# Patient Record
Sex: FEMALE | Race: BLACK OR AFRICAN AMERICAN | Employment: UNEMPLOYED | URBAN - METROPOLITAN AREA
[De-identification: names, ages, dates, MRNs, and addresses within clinical notes are randomized per-mention and may not be internally consistent; named-entity substitution may affect disease eponyms.]

---

## 2017-02-17 ENCOUNTER — ANESTHESIA EVENT (OUTPATIENT)
Dept: SURGERY | Age: 62
End: 2017-02-17
Payer: MEDICAID

## 2017-02-17 RX ORDER — CARVEDILOL 12.5 MG/1
TABLET ORAL 2 TIMES DAILY WITH MEALS
COMMUNITY

## 2017-02-17 RX ORDER — NITROGLYCERIN 0.4 MG/1
TABLET SUBLINGUAL
COMMUNITY

## 2017-02-17 RX ORDER — HYDROCHLOROTHIAZIDE 50 MG/1
25 TABLET ORAL DAILY
COMMUNITY

## 2017-02-17 RX ORDER — ASPIRIN 81 MG/1
TABLET ORAL DAILY
COMMUNITY

## 2017-02-17 RX ORDER — VALACYCLOVIR HYDROCHLORIDE 500 MG/1
500 TABLET, FILM COATED ORAL 2 TIMES DAILY
COMMUNITY

## 2017-02-17 RX ORDER — LISINOPRIL 20 MG/1
TABLET ORAL DAILY
COMMUNITY

## 2017-02-17 RX ORDER — CLOPIDOGREL BISULFATE 75 MG/1
75 TABLET ORAL
COMMUNITY
End: 2018-06-28

## 2017-02-17 RX ORDER — EZETIMIBE 10 MG/1
TABLET ORAL
COMMUNITY

## 2017-02-17 RX ORDER — ROSUVASTATIN CALCIUM 40 MG/1
40 TABLET, COATED ORAL
COMMUNITY

## 2017-02-20 ENCOUNTER — HOSPITAL ENCOUNTER (OUTPATIENT)
Age: 62
Setting detail: OUTPATIENT SURGERY
Discharge: HOME | End: 2017-02-20
Attending: DENTIST | Admitting: DENTIST
Payer: MEDICAID

## 2017-02-20 ENCOUNTER — SURGERY (OUTPATIENT)
Age: 62
End: 2017-02-20

## 2017-02-20 ENCOUNTER — ANESTHESIA (OUTPATIENT)
Dept: SURGERY | Age: 62
End: 2017-02-20
Payer: MEDICAID

## 2017-02-20 VITALS
BODY MASS INDEX: 21.62 KG/M2 | WEIGHT: 122 LBS | RESPIRATION RATE: 18 BRPM | HEART RATE: 60 BPM | TEMPERATURE: 98.1 F | DIASTOLIC BLOOD PRESSURE: 101 MMHG | SYSTOLIC BLOOD PRESSURE: 192 MMHG | OXYGEN SATURATION: 100 % | HEIGHT: 63 IN

## 2017-02-20 PROBLEM — K02.9 DENTAL CARIES: Status: ACTIVE | Noted: 2017-02-20

## 2017-02-20 LAB
GLUCOSE BLD STRIP.AUTO-MCNC: 112 MG/DL (ref 70–110)
GLUCOSE BLD STRIP.AUTO-MCNC: 113 MG/DL (ref 70–110)

## 2017-02-20 PROCEDURE — 82962 GLUCOSE BLOOD TEST: CPT

## 2017-02-20 PROCEDURE — 74011250636 HC RX REV CODE- 250/636: Performed by: NURSE ANESTHETIST, CERTIFIED REGISTERED

## 2017-02-20 PROCEDURE — 74011250636 HC RX REV CODE- 250/636

## 2017-02-20 PROCEDURE — 77030032490 HC SLV COMPR SCD KNE COVD -B: Performed by: DENTIST

## 2017-02-20 PROCEDURE — 74011000250 HC RX REV CODE- 250: Performed by: DENTIST

## 2017-02-20 PROCEDURE — 77030014006 HC SPNG HEMSTAT J&J -A: Performed by: DENTIST

## 2017-02-20 PROCEDURE — 77030018836 HC SOL IRR NACL ICUM -A: Performed by: DENTIST

## 2017-02-20 PROCEDURE — 77030031139 HC SUT VCRL2 J&J -A: Performed by: DENTIST

## 2017-02-20 PROCEDURE — 74011000250 HC RX REV CODE- 250

## 2017-02-20 PROCEDURE — 76210000016 HC OR PH I REC 1 TO 1.5 HR: Performed by: DENTIST

## 2017-02-20 PROCEDURE — 76210000021 HC REC RM PH II 0.5 TO 1 HR: Performed by: DENTIST

## 2017-02-20 PROCEDURE — 76060000033 HC ANESTHESIA 1 TO 1.5 HR: Performed by: DENTIST

## 2017-02-20 PROCEDURE — 74011250636 HC RX REV CODE- 250/636: Performed by: DENTIST

## 2017-02-20 PROCEDURE — 77030008683 HC TU ET CUF COVD -A: Performed by: ANESTHESIOLOGY

## 2017-02-20 PROCEDURE — 74011250637 HC RX REV CODE- 250/637

## 2017-02-20 PROCEDURE — 76010000149 HC OR TIME 1 TO 1.5 HR: Performed by: DENTIST

## 2017-02-20 RX ORDER — CEFAZOLIN SODIUM 2 G/50ML
2 SOLUTION INTRAVENOUS
Status: COMPLETED | OUTPATIENT
Start: 2017-02-20 | End: 2017-02-20

## 2017-02-20 RX ORDER — FAMOTIDINE 20 MG/1
TABLET, FILM COATED ORAL
Status: COMPLETED
Start: 2017-02-20 | End: 2017-02-20

## 2017-02-20 RX ORDER — AMOXICILLIN 500 MG/1
500 CAPSULE ORAL 3 TIMES DAILY
Qty: 21 CAP | Refills: 0 | Status: SHIPPED | OUTPATIENT
Start: 2017-02-20 | End: 2018-06-28

## 2017-02-20 RX ORDER — KETOROLAC TROMETHAMINE 30 MG/ML
INJECTION, SOLUTION INTRAMUSCULAR; INTRAVENOUS AS NEEDED
Status: DISCONTINUED | OUTPATIENT
Start: 2017-02-20 | End: 2017-02-20 | Stop reason: HOSPADM

## 2017-02-20 RX ORDER — CEFAZOLIN SODIUM 2 G/50ML
SOLUTION INTRAVENOUS
Status: DISCONTINUED
Start: 2017-02-20 | End: 2017-02-20 | Stop reason: HOSPADM

## 2017-02-20 RX ORDER — DEXTROSE MONOHYDRATE 25 G/50ML
25-50 INJECTION, SOLUTION INTRAVENOUS AS NEEDED
Status: DISCONTINUED | OUTPATIENT
Start: 2017-02-20 | End: 2017-02-20 | Stop reason: HOSPADM

## 2017-02-20 RX ORDER — PROPOFOL 10 MG/ML
INJECTION, EMULSION INTRAVENOUS AS NEEDED
Status: DISCONTINUED | OUTPATIENT
Start: 2017-02-20 | End: 2017-02-20 | Stop reason: HOSPADM

## 2017-02-20 RX ORDER — FENTANYL CITRATE 50 UG/ML
INJECTION, SOLUTION INTRAMUSCULAR; INTRAVENOUS AS NEEDED
Status: DISCONTINUED | OUTPATIENT
Start: 2017-02-20 | End: 2017-02-20 | Stop reason: HOSPADM

## 2017-02-20 RX ORDER — SODIUM CHLORIDE 0.9 % (FLUSH) 0.9 %
5-10 SYRINGE (ML) INJECTION AS NEEDED
Status: DISCONTINUED | OUTPATIENT
Start: 2017-02-20 | End: 2017-02-20 | Stop reason: HOSPADM

## 2017-02-20 RX ORDER — SODIUM CHLORIDE 0.9 % (FLUSH) 0.9 %
5-10 SYRINGE (ML) INJECTION EVERY 8 HOURS
Status: DISCONTINUED | OUTPATIENT
Start: 2017-02-20 | End: 2017-02-20 | Stop reason: HOSPADM

## 2017-02-20 RX ORDER — HYDROCODONE BITARTRATE AND ACETAMINOPHEN 7.5; 325 MG/1; MG/1
1 TABLET ORAL
Qty: 20 TAB | Refills: 0 | Status: SHIPPED | OUTPATIENT
Start: 2017-02-20 | End: 2018-06-28

## 2017-02-20 RX ORDER — HYDROCODONE BITARTRATE AND ACETAMINOPHEN 5; 325 MG/1; MG/1
1 TABLET ORAL ONCE
Status: DISCONTINUED | OUTPATIENT
Start: 2017-02-20 | End: 2017-02-20 | Stop reason: HOSPADM

## 2017-02-20 RX ORDER — MAGNESIUM SULFATE 100 %
4 CRYSTALS MISCELLANEOUS AS NEEDED
Status: DISCONTINUED | OUTPATIENT
Start: 2017-02-20 | End: 2017-02-20 | Stop reason: HOSPADM

## 2017-02-20 RX ORDER — FENTANYL CITRATE 50 UG/ML
50 INJECTION, SOLUTION INTRAMUSCULAR; INTRAVENOUS AS NEEDED
Status: DISCONTINUED | OUTPATIENT
Start: 2017-02-20 | End: 2017-02-20 | Stop reason: HOSPADM

## 2017-02-20 RX ORDER — OXYMETAZOLINE HCL 0.05 %
SPRAY, NON-AEROSOL (ML) NASAL AS NEEDED
Status: DISCONTINUED | OUTPATIENT
Start: 2017-02-20 | End: 2017-02-20 | Stop reason: HOSPADM

## 2017-02-20 RX ORDER — ONDANSETRON 2 MG/ML
INJECTION INTRAMUSCULAR; INTRAVENOUS AS NEEDED
Status: DISCONTINUED | OUTPATIENT
Start: 2017-02-20 | End: 2017-02-20 | Stop reason: HOSPADM

## 2017-02-20 RX ORDER — SODIUM CHLORIDE, SODIUM LACTATE, POTASSIUM CHLORIDE, CALCIUM CHLORIDE 600; 310; 30; 20 MG/100ML; MG/100ML; MG/100ML; MG/100ML
75 INJECTION, SOLUTION INTRAVENOUS CONTINUOUS
Status: DISCONTINUED | OUTPATIENT
Start: 2017-02-20 | End: 2017-02-20 | Stop reason: HOSPADM

## 2017-02-20 RX ORDER — IBUPROFEN 800 MG/1
800 TABLET ORAL
Qty: 30 TAB | Refills: 0 | Status: SHIPPED | OUTPATIENT
Start: 2017-02-20 | End: 2018-06-28

## 2017-02-20 RX ORDER — SUCCINYLCHOLINE CHLORIDE 20 MG/ML
INJECTION INTRAMUSCULAR; INTRAVENOUS AS NEEDED
Status: DISCONTINUED | OUTPATIENT
Start: 2017-02-20 | End: 2017-02-20 | Stop reason: HOSPADM

## 2017-02-20 RX ORDER — FAMOTIDINE 20 MG/1
20 TABLET, FILM COATED ORAL ONCE
Status: COMPLETED | OUTPATIENT
Start: 2017-02-21 | End: 2017-02-20

## 2017-02-20 RX ORDER — DIPHENHYDRAMINE HYDROCHLORIDE 50 MG/ML
25 INJECTION, SOLUTION INTRAMUSCULAR; INTRAVENOUS
Status: DISCONTINUED | OUTPATIENT
Start: 2017-02-20 | End: 2017-02-20 | Stop reason: HOSPADM

## 2017-02-20 RX ORDER — INSULIN LISPRO 100 [IU]/ML
INJECTION, SOLUTION INTRAVENOUS; SUBCUTANEOUS ONCE
Status: DISCONTINUED | OUTPATIENT
Start: 2017-02-20 | End: 2017-02-20 | Stop reason: HOSPADM

## 2017-02-20 RX ORDER — MIDAZOLAM HYDROCHLORIDE 1 MG/ML
INJECTION, SOLUTION INTRAMUSCULAR; INTRAVENOUS AS NEEDED
Status: DISCONTINUED | OUTPATIENT
Start: 2017-02-20 | End: 2017-02-20 | Stop reason: HOSPADM

## 2017-02-20 RX ORDER — SODIUM CHLORIDE, SODIUM LACTATE, POTASSIUM CHLORIDE, CALCIUM CHLORIDE 600; 310; 30; 20 MG/100ML; MG/100ML; MG/100ML; MG/100ML
50 INJECTION, SOLUTION INTRAVENOUS CONTINUOUS
Status: DISCONTINUED | OUTPATIENT
Start: 2017-02-21 | End: 2017-02-20 | Stop reason: HOSPADM

## 2017-02-20 RX ORDER — LIDOCAINE HYDROCHLORIDE 20 MG/ML
INJECTION, SOLUTION EPIDURAL; INFILTRATION; INTRACAUDAL; PERINEURAL AS NEEDED
Status: DISCONTINUED | OUTPATIENT
Start: 2017-02-20 | End: 2017-02-20 | Stop reason: HOSPADM

## 2017-02-20 RX ORDER — LIDOCAINE HYDROCHLORIDE AND EPINEPHRINE 10; 10 MG/ML; UG/ML
INJECTION, SOLUTION INFILTRATION; PERINEURAL AS NEEDED
Status: DISCONTINUED | OUTPATIENT
Start: 2017-02-20 | End: 2017-02-20 | Stop reason: HOSPADM

## 2017-02-20 RX ORDER — DEXTROSE 50 % IN WATER (D50W) INTRAVENOUS SYRINGE
25-50 AS NEEDED
Status: DISCONTINUED | OUTPATIENT
Start: 2017-02-20 | End: 2017-02-20 | Stop reason: HOSPADM

## 2017-02-20 RX ADMIN — ONDANSETRON 4 MG: 2 INJECTION INTRAMUSCULAR; INTRAVENOUS at 10:18

## 2017-02-20 RX ADMIN — SODIUM CHLORIDE, SODIUM LACTATE, POTASSIUM CHLORIDE, AND CALCIUM CHLORIDE 75 ML/HR: 600; 310; 30; 20 INJECTION, SOLUTION INTRAVENOUS at 08:57

## 2017-02-20 RX ADMIN — PROPOFOL 50 MG: 10 INJECTION, EMULSION INTRAVENOUS at 10:16

## 2017-02-20 RX ADMIN — FENTANYL CITRATE 100 MCG: 50 INJECTION, SOLUTION INTRAMUSCULAR; INTRAVENOUS at 10:06

## 2017-02-20 RX ADMIN — CEFAZOLIN SODIUM 2 G: 2 SOLUTION INTRAVENOUS at 10:05

## 2017-02-20 RX ADMIN — LIDOCAINE HYDROCHLORIDE 60 MG: 20 INJECTION, SOLUTION EPIDURAL; INFILTRATION; INTRACAUDAL; PERINEURAL at 10:06

## 2017-02-20 RX ADMIN — Medication 2 SPRAY: at 10:11

## 2017-02-20 RX ADMIN — SUCCINYLCHOLINE CHLORIDE 100 MG: 20 INJECTION INTRAMUSCULAR; INTRAVENOUS at 10:07

## 2017-02-20 RX ADMIN — KETOROLAC TROMETHAMINE 30 MG: 30 INJECTION, SOLUTION INTRAMUSCULAR; INTRAVENOUS at 10:18

## 2017-02-20 RX ADMIN — SODIUM CHLORIDE, SODIUM LACTATE, POTASSIUM CHLORIDE, AND CALCIUM CHLORIDE: 600; 310; 30; 20 INJECTION, SOLUTION INTRAVENOUS at 09:52

## 2017-02-20 RX ADMIN — FAMOTIDINE 20 MG: 20 TABLET ORAL at 08:58

## 2017-02-20 RX ADMIN — FAMOTIDINE 20 MG: 20 TABLET, FILM COATED ORAL at 08:58

## 2017-02-20 RX ADMIN — PROPOFOL 100 MG: 10 INJECTION, EMULSION INTRAVENOUS at 10:07

## 2017-02-20 RX ADMIN — MIDAZOLAM HYDROCHLORIDE 2 MG: 1 INJECTION, SOLUTION INTRAMUSCULAR; INTRAVENOUS at 10:00

## 2017-02-20 RX ADMIN — LIDOCAINE HYDROCHLORIDE AND EPINEPHRINE 12 ML: 10; 10 INJECTION, SOLUTION INFILTRATION; PERINEURAL at 10:21

## 2017-02-20 NOTE — DISCHARGE INSTRUCTIONS
[facility]  Post-op instructions for patients  please call the office on the next business day to schedule your post-op appt    Please read and follow these instructions carefully. The after-effects of oral surgery vary per individual, so not all of these instructions may apply. Please feel free to call our office any time should you have any questions, or are experiencing any unusual symptoms following your treatment. DAY OF SURGERY:    Immediately after surgery. Patients who received a general anesthetic should return home from the office immediately upon discharge, and lie down with the head elevated until all the effects of the anesthetic has disappeared. Anesthetic effects vary by individual, and you may feel drowsy for a short period of time or for several hours. You should not operate any mechanical equipment or drive a motor vehicle for at least 12 hours or longer if you feel any residual effect from the anesthetic. 1. Do not drive or use appliances or equipment that could be dangerous, suck as power tools, stoves, burners,  and garbage disposals. 2. Watch our for dizziness. Walk slowly and take your time. Sudden changes of position can also cause nausea. 3. Do not make any important decisions. You may change your mind tomorrow. 4. Do not drink any alcoholic beverages. The drugs in your body may cause your reaction to alcohol to be dangerous. 5. Diet: If you feel nauseated or sick to your stomach, drink clear liquids like 7-up, broth, apple juice, ginger ale, tea or cola or eat jello. If these liquids do not make you sick to your stomach try eating soft foods like potatoes, rice, pasta and cereal.  6. Discuss any questions you may have with your surgeon, Dr. Bennie Decker or Dr. Vineet Flanagan, who can be reached at [phone].  7. In the event of a medical emergency, call [phone]. ORAL HYGIENE AND CARE: Do not disturb the surgical area today.  Bite down gently but firmly on the gauze pack that we have initially placed over the surgical area making sure that they remain in place. Do not change them for the first hour unless the bleeding is not being controlled. This is important to allow blood clot formation on the surgical site. The gauze may be changed when necessary and/or repositioned for comfort. DO NOT drink with a  Straw and DO NOT rinse or brush your teeth vigorously or probe the area with the tongue, any objects or your fingers. You may brush your teeth gently, carefully avoiding the surgical site. DO NOT SMOKE for at least 72 hours, since it is detrimental to the healing process. NEXT DAY: Start rinsing your mouth with a warm salt water rinse (1/2 tsp salt with 1 cup water). Continue this for several days, then rinse 3-4 times a day for the next 2 weeks. You may start normal toothbrushing the day after the surgery or after bleeding is controlled. It is imperative to keep your mouth clean, since an accumulation of food or debris may promote infection. BLEEDING: Some bleeding is normal, and blood tinged saliva may be present for 24 hours. This may be controlled by placing fresh gauze over the surgical area and biting down firmly for 30-60 seconds. STEADY BLEEDING: Bleeding should not be severe. If bleeding persists, this may be due to the gauze pads being clenched between the teeth rather than exerting pressure on the surgical site. Try repositioning the gauze. If bleeding persists or become heavy, substitute a moist tea bag (first soaked in hot water, squeezed dry and wrapped in a moist gauze) on the area for 20-30 minutes. If bleeding continues, please call our office. SWELLING OR BRUISING: Swelling is to be expected, and usually reached its maximum in 48 hours. To minimize swelling, cold packs or ice bag wrapped in towel should be applied to the face adjacent to the surgical area.  This should be applied 20 minutes on then removed for 20 minutes during the first 12-24 hours after surgery. If you were prescribed medicine for the control of swelling, be sure to take it as directed. After 24 hours,it is usually best to switch from using the cold pack to applying moist heat or heading pad to the same area until swelling has receded. Bruising may also occur, but should disappear soon. Tightness of the jaw muscles may cause difficulty in opening the mouth. This should disappear with 7 days. Keep lips moist with cream or vaseline to   prevent cracking or chapping. DIET: Eat any nourishing food that can be taken with comfort. It is advisable to confine the first day's food intake to bland liquids or pureed or soft foods. Avoid foods like nuts, sunflower seeds or popcorn, which may become lodged in the socket areas. Over the next several days, you may progress to more solid foods. Proper nourishment aids in the healing process. If you are a diabetic, maintain your normal diet as much as possible and follow your physician's instructions regarding your insulin schedule. **soft cold foods for day 1: jello, pudding, applesauce, yogurt, sherbet, milkshakes (no seeds/nuts)**    PAIN AND MEDICATIONS:  Unfortunately, most oral surgery is accompanied by some degree of discomfort. Take the pain medication prescribed as directed. The local anesthetic administered with the general anesthetic during your surgery normally has a 3-hour duration, and it may be difficult to control the pain once the anesthesia wears off. We therefore, advise you to take the pain medication immediately after your surgery. If our do not achieve adequate pain relief, you may supplement each pill with an analgesic such as Advil, Motrin or acetaminophen. Taking the pain medication with soft food and a large volume of water will lessen any side effects of nausea or stomach upset.     If your were prescribed an antibiotic and are currently taking oral contraceptives you should use an alternate method of birth control for the remainder of this cycle. Orthodontic Appliances: If you wear orthodontic appliances, replace them immediately after surgery unless otherwise instructed. If these appliances are left out of the mouth for any length of time, it is often difficult or impossible to reinsert them. INSTRUCTIONS FOR THE FOLLOWING DAYS:    ORAL HYGIENE: Keeping your mouth clean after oral surgery is essential.  Keep using warm salt water rinses to rinse your mouth at least 2-3 times per day for the next 5 days. Begin your normal toothbrushing routine. Soreness and swelling may prevent rigorous brushing of all areas, but make every effort to clean your teeth within your comfort level. CARE OF SURGICAL AREA:  Apply warm compresses to the skin overlying areas of swelling for 20 minutes on and 20 minutes off to help soothe these tender areas. Start stretching the mouth open to help with swelling and stiffness. OTHER POSSIBLE POST-SURGERY EFFECTS:    DRY SOCKETS:  The blood clot on the surgical site may be lost causing a dry socket (usually on the 3rd to 5th day). There will be a noticeable, distinct, persistent pain in the jaw area, often radiating toward the ear and forward along the jaw which may cause other teeth to ache. If you do not see steady improvement during the first few days after surgery or if severe pain persists, please call the office to report these symptoms. SKIN DISCOLORATION:  This may be expected, and is usually limited to the neck or cheek area near the surgical site. This is caused by bleeding through the mucous membranes of the mouth beneath the skin and appears as a bruise. If discoloration occurs, it often takes a week for this to completely disappear. Occasionally, the arm or hand near the site where the needle was placed to administer IV drugs may remain inflamed and tender. This is caused by chemical irritation in the vein.   Asprin and application of heat on the area will usually correct these symptoms. NUMBNESS: Loss of sensation of the lip and chin may occur, usually following lower wisdom teeth removal.  This is usually temporary and disappears within a few days or weeks. Occasionally, some numbness may persist for months, due to the close association of the roots of the teeth to the nerve that supplies sensation to these areas described. It is our desire that your recovery be as smooth and pleasant as possible. If you have any questions about your progress or any symptoms you are experiencing, please call our office at [phone]  After office hours, you may call our 24-hour answering service and our doctor will contact you as soon as possible. Call 911 if you have a medical emergency    [address]. [place] FOR MORE INFORMATION. DISCHARGE SUMMARY from Nurse    The following personal items are in your possession at time of discharge:    Dental Appliances: None  Visual Aid: None     Home Medications:  (in plastic bag)  Jewelry: None  Clothing: Pants, Footwear, Undergarments, Socks, Shirt  Other Valuables: None  Personal Items Sent to Safe[facility] with sister          PATIENT INSTRUCTIONS:    After general anesthesia or intravenous sedation, for 24 hours or while taking prescription Narcotics:  · Limit your activities  · Do not drive and operate hazardous machinery  · Do not make important personal or business decisions  · Do  not drink alcoholic beverages  · If you have not urinated within 8 hours after discharge, please contact your surgeon on call.     Report the following to your surgeon:  · Excessive pain, swelling, redness or odor of or around the surgical area  · Temperature over 100.5  · Nausea and vomiting lasting longer than 4 hours or if unable to take medications  · Any signs of decreased circulation or nerve impairment to extremity: change in color, persistent  numbness, tingling, coldness or increase pain  · Any questions        What to do at Home:  These are general instructions for a healthy lifestyle:    No smoking/ No tobacco products/ Avoid exposure to second hand smoke    Surgeon General's Warning:  Quitting smoking now greatly reduces serious risk to your health. Obesity, smoking, and sedentary lifestyle greatly increases your risk for illness    A healthy diet, regular physical exercise & weight monitoring are important for maintaining a healthy lifestyle    You may be retaining fluid if you have a history of heart failure or if you experience any of the following symptoms:  Weight gain of 3 pounds or more overnight or 5 pounds in a week, increased swelling in our hands or feet or shortness of breath while lying flat in bed. Please call your doctor as soon as you notice any of these symptoms; do not wait until your next office visit. Recognize signs and symptoms of STROKE:    F-face looks uneven    A-arms unable to move or move unevenly    S-speech slurred or non-existent    T-time-call 911 as soon as signs and symptoms begin-DO NOT go       Back to bed or wait to see if you get better-TIME IS BRAIN. Warning Signs of HEART ATTACK     Call 911 if you have these symptoms:   Chest discomfort. Most heart attacks involve discomfort in the center of the chest that lasts more than a few minutes, or that goes away and comes back. It can feel like uncomfortable pressure, squeezing, fullness, or pain.  Discomfort in other areas of the upper body. Symptoms can include pain or discomfort in one or both arms, the back, neck, jaw, or stomach.  Shortness of breath with or without chest discomfort.  Other signs may include breaking out in a cold sweat, nausea, or lightheadedness. Don't wait more than five minutes to call 911 - MINUTES MATTER! Fast action can save your life. Calling 911 is almost always the fastest way to get lifesaving treatment.  Emergency Medical Services staff can begin treatment when they arrive -- up to an hour sooner than if someone gets to the hospital by car. The discharge information has been reviewed with the patient. The patient verbalized understanding. Discharge medications reviewed with the patient and appropriate educational materials and side effects teaching were provided. Patient armband removed and given to patient to take home.   Patient was informed of the privacy risks if armband lost or stolen

## 2017-02-20 NOTE — IP AVS SNAPSHOT
[address] [place] [address] [address] 
[phone] Patient: Sarah Calero 
MRN: [id number] FEA:[birth year] You are allergic to the following No active allergies Recent Documentation Height Weight BMI OB Status Smoking Status 1.6 m 55.3 kg 21.61 kg/m2 Postmenopausal Never Smoker Emergency Contacts Name Discharge Info Relation Home Work Mobile Cathy Ann DISCHARGE CAREGIVER [3] Sister [23] [phone] About your hospitalization You were admitted on:  February 20, 2017 You last received care in the:  [facility] PHASE 2 RECOVERY You were discharged on:  February 20, 2017 Unit phone number:  [phone] Why you were hospitalized Your primary diagnosis was:  Not on File Your diagnoses also included:  Dental Caries Providers Seen During Your Hospitalizations Provider Role Specialty Primary office phone Damian Saab DDS Attending Provider Oral Surgery [phone] Your Primary Care Physician (PCP) Primary Care Physician Office Phone Office Fax Juan Garland [phone] Follow-up Information Follow up With Details Comments Contact Info Damian Saab DDS Call in 1 week For wound re-check, For suture removal 1 [facility] [id number] 
[phone] Current Discharge Medication List  
  
START taking these medications Dose & Instructions Dispensing Information Comments Morning Noon Evening Bedtime  
 amoxicillin 500 mg capsule Commonly known as:  AMOXIL Your next dose is: Today, Tomorrow Other:  _________ Dose:  500 mg Take 1 Cap by mouth three (3) times daily. Quantity:  21 Cap Refills:  0 HYDROcodone-acetaminophen 7.5-325 mg per tablet Commonly known as:  Marichuycatherine Edmondsgineri Your next dose is: Today, Tomorrow Other:  _________ Dose:  1 Tab Take 1 Tab by mouth every six (6) hours as needed for Pain. Max Daily Amount: 4 Tabs. Quantity:  20 Tab Refills:  0  
     
   
   
   
  
 ibuprofen 800 mg tablet Commonly known as:  MOTRIN Your next dose is: Today, Tomorrow Other:  _________ Dose:  800 mg Take 1 Tab by mouth every eight (8) hours as needed for Pain. Quantity:  30 Tab Refills:  0 CONTINUE these medications which have NOT CHANGED Dose & Instructions Dispensing Information Comments Morning Noon Evening Bedtime  
 aspirin delayed-release 81 mg tablet Your next dose is: Today, Tomorrow Other:  _________ Take  by mouth daily. Refills:  0 COREG 12.5 mg tablet Generic drug:  carvedilol Your next dose is: Today, Tomorrow Other:  _________ Take  by mouth two (2) times daily (with meals). Refills:  0  
     
   
   
   
  
 CRESTOR 40 mg tablet Generic drug:  rosuvastatin Your next dose is: Today, Tomorrow Other:  _________ Dose:  40 mg Take 40 mg by mouth nightly. Refills:  0  
     
   
   
   
  
 hydroCHLOROthiazide 50 mg tablet Commonly known as:  HYDRODIURIL Your next dose is: Today, Tomorrow Other:  _________ Dose:  25 mg Take 25 mg by mouth daily. Refills:  0 JANUVIA 100 mg tablet Generic drug:  SITagliptin Your next dose is: Today, Tomorrow Other:  _________ Dose:  100 mg Take 100 mg by mouth daily. Refills:  0  
     
   
   
   
  
 lisinopril 20 mg tablet Commonly known as:  Marge Whitney Your next dose is: Today, Tomorrow Other:  _________ Take  by mouth daily. Refills:  0  
     
   
   
   
  
 nitroglycerin 0.4 mg SL tablet Commonly known as:  NITROSTAT Your next dose is: Today, Tomorrow Other:  _________  
   
   
 by SubLINGual route every five (5) minutes as needed for Chest Pain. Refills:  0 PLAVIX 75 mg Tab Generic drug:  clopidogrel Your next dose is: Today, Tomorrow Other:  _________ Dose:  75 mg Take 75 mg by mouth. Refills:  0  
     
   
   
   
  
 valACYclovir 500 mg tablet Commonly known as:  VALTREX Your next dose is: Today, Tomorrow Other:  _________ Dose:  500 mg Take 500 mg by mouth two (2) times a day. Refills:  0 ZETIA 10 mg tablet Generic drug:  ezetimibe Your next dose is: Today, Tomorrow Other:  _________ Take  by mouth. Refills:  0 Where to Get Your Medications Information on where to get these meds will be given to you by the nurse or doctor. ! Ask your nurse or doctor about these medications  
  amoxicillin 500 mg capsule HYDROcodone-acetaminophen 7.5-325 mg per tablet  
 ibuprofen 800 mg tablet Discharge Instructions [facility] Oral Surgery & Dental Implants Post-op instructions for patients 
please call the office on the next business day to schedule your post-op appt Please read and follow these instructions carefully. The after-effects of oral surgery vary per individual, so not all of these instructions may apply. Please feel free to call our office any time should you have any questions, or are experiencing any unusual symptoms following your treatment. DAY OF SURGERY: 
 
Immediately after surgery. Patients who received a general anesthetic should return home from the office immediately upon discharge, and lie down with the head elevated until all the effects of the anesthetic has disappeared. Anesthetic effects vary by individual, and you may feel drowsy for a short period of time or for several hours.   You should not operate any mechanical equipment or drive a motor vehicle for at least 12 hours or longer if you feel any residual effect from the anesthetic. 1. Do not drive or use appliances or equipment that could be dangerous, suck as power tools, stoves, burners,  and garbage disposals. 2. Watch our for dizziness. Walk slowly and take your time. Sudden changes of position can also cause nausea. 3. Do not make any important decisions. You may change your mind tomorrow. 4. Do not drink any alcoholic beverages. The drugs in your body may cause your reaction to alcohol to be dangerous. 5. Diet: If you feel nauseated or sick to your stomach, drink clear liquids like 7-up, broth, apple juice, ginger ale, tea or cola or eat jello. If these liquids do not make you sick to your stomach try eating soft foods like potatoes, rice, pasta and cereal. 
6. Discuss any questions you may have with your surgeon, Dr. Liyah Wright or Dr. Yareli Sauer, who can be reached at [phone]. 
7. In the event of a medical emergency, call 911. ORAL HYGIENE AND CARE: Do not disturb the surgical area today. Bite down gently but firmly on the gauze pack that we have initially placed over the surgical area making sure that they remain in place. Do not change them for the first hour unless the bleeding is not being controlled. This is important to allow blood clot formation on the surgical site. The gauze may be changed when necessary and/or repositioned for comfort. DO NOT drink with a  Straw and DO NOT rinse or brush your teeth vigorously or probe the area with the tongue, any objects or your fingers. You may brush your teeth gently, carefully avoiding the surgical site. DO NOT SMOKE for at least 72 hours, since it is detrimental to the healing process. NEXT DAY: Start rinsing your mouth with a warm salt water rinse (1/2 tsp salt with 1 cup water).  Continue this for several days, then rinse 3-4 times a day for the next 2 weeks. You may start normal toothbrushing the day after the surgery or after bleeding is controlled. It is imperative to keep your mouth clean, since an accumulation of food or debris may promote infection. BLEEDING: Some bleeding is normal, and blood tinged saliva may be present for 24 hours. This may be controlled by placing fresh gauze over the surgical area and biting down firmly for 30-60 seconds. STEADY BLEEDING: Bleeding should not be severe. If bleeding persists, this may be due to the gauze pads being clenched between the teeth rather than exerting pressure on the surgical site. Try repositioning the gauze. If bleeding persists or become heavy, substitute a moist tea bag (first soaked in hot water, squeezed dry and wrapped in a moist gauze) on the area for 20-30 minutes. If bleeding continues, please call our office. SWELLING OR BRUISING: Swelling is to be expected, and usually reached its maximum in 48 hours. To minimize swelling, cold packs or ice bag wrapped in towel should be applied to the face adjacent to the surgical area. This should be applied 20 minutes on then removed for 20 minutes during the first 12-24 hours after surgery. If you were prescribed medicine for the control of swelling, be sure to take it as directed. After 24 hours,it is usually best to switch from using the cold pack to applying moist heat or heading pad to the same area until swelling has receded. Bruising may also occur, but should disappear soon. Tightness of the jaw muscles may cause difficulty in opening the mouth. This should disappear with 7 days. Keep lips moist with cream or vaseline to  
prevent cracking or chapping. DIET: Eat any nourishing food that can be taken with comfort. It is advisable to confine the first day's food intake to bland liquids or pureed or soft foods.   Avoid foods like nuts, sunflower seeds or popcorn, which may become lodged in the socket areas. Over the next several days, you may progress to more solid foods. Proper nourishment aids in the healing process. If you are a diabetic, maintain your normal diet as much as possible and follow your physician's instructions regarding your insulin schedule. **soft cold foods for day 1: jello, pudding, applesauce, yogurt, sherbet, milkshakes (no seeds/nuts)** PAIN AND MEDICATIONS:  Unfortunately, most oral surgery is accompanied by some degree of discomfort. Take the pain medication prescribed as directed. The local anesthetic administered with the general anesthetic during your surgery normally has a 3-hour duration, and it may be difficult to control the pain once the anesthesia wears off. We therefore, advise you to take the pain medication immediately after your surgery. If our do not achieve adequate pain relief, you may supplement each pill with an analgesic such as Advil, Motrin or acetaminophen. Taking the pain medication with soft food and a large volume of water will lessen any side effects of nausea or stomach upset. If your were prescribed an antibiotic and are currently taking oral contraceptives you should use an alternate method of birth control for the remainder of this cycle. Orthodontic Appliances: If you wear orthodontic appliances, replace them immediately after surgery unless otherwise instructed. If these appliances are left out of the mouth for any length of time, it is often difficult or impossible to reinsert them. INSTRUCTIONS FOR THE FOLLOWING DAYS: 
 
ORAL HYGIENE: Keeping your mouth clean after oral surgery is essential.  Keep using warm salt water rinses to rinse your mouth at least 2-3 times per day for the next 5 days. Begin your normal toothbrushing routine. Soreness and swelling may prevent rigorous brushing of all areas, but make every effort to clean your teeth within your comfort level. CARE OF SURGICAL AREA:  Apply warm compresses to the skin overlying areas of swelling for 20 minutes on and 20 minutes off to help soothe these tender areas. Start stretching the mouth open to help with swelling and stiffness. OTHER POSSIBLE POST-SURGERY EFFECTS: 
 
DRY SOCKETS:  The blood clot on the surgical site may be lost causing a dry socket (usually on the 3rd to 5th day). There will be a noticeable, distinct, persistent pain in the jaw area, often radiating toward the ear and forward along the jaw which may cause other teeth to ache. If you do not see steady improvement during the first few days after surgery or if severe pain persists, please call the office to report these symptoms. SKIN DISCOLORATION:  This may be expected, and is usually limited to the neck or cheek area near the surgical site. This is caused by bleeding through the mucous membranes of the mouth beneath the skin and appears as a bruise. If discoloration occurs, it often takes a week for this to completely disappear. Occasionally, the arm or hand near the site where the needle was placed to administer IV drugs may remain inflamed and tender. This is caused by chemical irritation in the vein. Asprin and application of heat on the area will usually correct these symptoms. NUMBNESS: Loss of sensation of the lip and chin may occur, usually following lower wisdom teeth removal.  This is usually temporary and disappears within a few days or weeks. Occasionally, some numbness may persist for months, due to the close association of the roots of the teeth to the nerve that supplies sensation to these areas described. It is our desire that your recovery be as smooth and pleasant as possible. If you have any questions about your progress or any symptoms you are experiencing, please call our office at [phone] After office hours, you may call our 24-hour answering service and our doctor will contact you as soon as possible. Call 911 if you have a medical emergency GO TO WWW. [facility]OR[facility]. COM FOR MORE INFORMATION. DISCHARGE SUMMARY from Nurse The following personal items are in your possession at time of discharge: 
 
Dental Appliances: None Visual Aid: None Home Medications:  (in plastic bag) Jewelry: None Clothing: Pants, Footwear, Undergarments, Socks, Shirt Other Valuables: None Personal Items Sent to Safe: with sister PATIENT INSTRUCTIONS: 
 
After general anesthesia or intravenous sedation, for 24 hours or while taking prescription Narcotics: · Limit your activities · Do not drive and operate hazardous machinery · Do not make important personal or business decisions · Do  not drink alcoholic beverages · If you have not urinated within 8 hours after discharge, please contact your surgeon on call. Report the following to your surgeon: 
· Excessive pain, swelling, redness or odor of or around the surgical area · Temperature over 100.5 · Nausea and vomiting lasting longer than 4 hours or if unable to take medications · Any signs of decreased circulation or nerve impairment to extremity: change in color, persistent  numbness, tingling, coldness or increase pain · Any questions What to do at Home: These are general instructions for a healthy lifestyle: No smoking/ No tobacco products/ Avoid exposure to second hand smoke Surgeon General's Warning:  Quitting smoking now greatly reduces serious risk to your health. Obesity, smoking, and sedentary lifestyle greatly increases your risk for illness A healthy diet, regular physical exercise & weight monitoring are important for maintaining a healthy lifestyle You may be retaining fluid if you have a history of heart failure or if you experience any of the following symptoms:  Weight gain of 3 pounds or more overnight or 5 pounds in a week, increased swelling in our hands or feet or shortness of breath while lying flat in bed. Please call your doctor as soon as you notice any of these symptoms; do not wait until your next office visit. Recognize signs and symptoms of STROKE: 
 
F-face looks uneven A-arms unable to move or move unevenly S-speech slurred or non-existent T-time-call 911 as soon as signs and symptoms begin-DO NOT go Back to bed or wait to see if you get better-TIME IS BRAIN. Warning Signs of HEART ATTACK Call 911 if you have these symptoms: 
? Chest discomfort. Most heart attacks involve discomfort in the center of the chest that lasts more than a few minutes, or that goes away and comes back. It can feel like uncomfortable pressure, squeezing, fullness, or pain. ? Discomfort in other areas of the upper body. Symptoms can include pain or discomfort in one or both arms, the back, neck, jaw, or stomach. ? Shortness of breath with or without chest discomfort. ? Other signs may include breaking out in a cold sweat, nausea, or lightheadedness. Don't wait more than five minutes to call [address]! Fast action can save your life. Calling 911 is almost always the fastest way to get lifesaving treatment. Emergency Medical Services staff can begin treatment when they arrive  up to an hour sooner than if someone gets to the hospital by car. The discharge information has been reviewed with the patient. The patient verbalized understanding. Discharge medications reviewed with the patient and appropriate educational materials and side effects teaching were provided. Patient armband removed and given to patient to take home. Patient was informed of the privacy risks if armband lost or stolen Discharge Orders None Introducing [facility] SERVICES! Joshua Kirby introduces [email or web address] patient portal. Now you can access parts of your medical record, email your doctor's office, and request medication refills online. 1. In your internet browser, go to [email or web address] 2. Click on the First Time User? Click Here link in the Sign In box. You will see the New Member Sign Up page. 3. Enter your Jigsaw Access Code exactly as it appears below. You will not need to use this code after youve completed the sign-up process. If you do not sign up before the expiration date, you must request a new code. · Jigsaw Access Code: [email or web address] Expires: 5/16/2017  9:59 AM 
 
4. Enter the last four digits of your Social Security Number (xxxx) and Date of Birth (mm/dd/yyyy) as indicated and click Submit. You will be taken to the next sign-up page. 5. Create a Jigsaw ID. This will be your Jigsaw login ID and cannot be changed, so think of one that is secure and easy to remember. 6. Create a Jigsaw password. You can change your password at any time. 7. Enter your Password Reset Question and Answer. This can be used at a later time if you forget your password. 8. Enter your e-mail address. You will receive e-mail notification when new information is available in [address] 9. Click Sign Up. You can now view and download portions of your medical record. 10. Click the Download Summary menu link to download a portable copy of your medical information. If you have questions, please visit the Frequently Asked Questions section of the Jigsaw website. Remember, Jigsaw is NOT to be used for urgent needs. For medical emergencies, dial 911. Now available from your iPhone and Android! General Information Please provide this summary of care documentation to your next provider. Patient Signature:  ____________________________________________________________ Date:  ____________________________________________________________  
  
Orbie Izaguirre Provider Signature:  ____________________________________________________________ Date:  ____________________________________________________________

## 2017-02-20 NOTE — H&P
I have examined the patient and reviewed the history and physical and there have been no changes as of this date.   Kerri Corona

## 2017-02-20 NOTE — OP NOTES
OPERATIVE REPORT      DATE:  February 20, 2017'          PATIENT:  Gaston Mendez        ASSISTANT: Gary Millan    Location: [facility]    PREOPERATIVE FINDINGS:  Dental caries; multiple unrestorable teeth    The patient was referred from her general dentist for extraction of multiple unrestorable teeth. After clinical and radiographic examination it was felt that the patient could best receive her surgery at [facility] due to multiple medical problems. The risks and benefits and options were discussed and signed and witnessed informed consent was provided. PROVIDER :  Jaclyn Bonilla DDS  . PROCEDURE:  Surgical Extraction of Teeth #3 16 32  Surgical removal of retained roots #1 5 12 13 15 17 30    PREOP:  Consent confirmed signed and tooth numbers confirmed with patient. PATIENT STILL SYMPTOMATIC, Escort Present. Consent previously signed. All questions and concerns addressed. Nasotracheal tube was inserted & secured by anesthesia. The patient was prepped and draped in a sterile fashion. A  Throat packing was placed. There was no evidendence of impingement on the patients lips with mouth prop. Local anesthesia was infiltrated into bilateral maxillary vesitibules, palate, and bilateral mandibular blocks. Care was taken to confirm that an intravascular injection was avoided. It must be noted that full thickness flaps were performed in all locations of the teeth being extracted. A 15 blade was used for sulcular incision with a distal buccal  release. Overlying bone (superior/buccal) was removed with a curette / #6 round bur in areas of teeth 1 3 5 12 13 15 16 17 30 32. At all times copius irrigation was used during the removal of bone and tooth sectioning. At all times care was taken to use a buccal surgical approach to expose and section the tooth. An atraumatic extraction of the tooth and/or roots was performed with a universal forceps.   All sharp edges were smooth with a ronguer forceps. The site was irrigated with Normal Saline Irrigation. Site curretaged and irrigated. Care taken when curretage apical portion of socket. All sharp areas were smoothed w ronguer & bone file. 3.0 vicryl suture for primary closure of gingiva. Hemostasis had been achieved. Throat was clear of any debri. Throat packing was removed. The care of the patient was returned to the anesthetist who awakened and extubated the patient in the OR without complications. The patient was taken to the PACU with stable vital signs having tolerated the procedure well. Noted:   #1 bone removed  #3 bone removed  #5 bone removed  #12 bone removed  #13 bone removed  #15 bone removed  #16 bone removed  #17 bone removed  #30 bone removed  #32 bone removed      LOCAL ANESTHESIA:    2% Lidocaine w epi    PRESCRIPTIONS:  Ibuprofen 800 mg  Norco 7.5/325  Amoxicillin 500 mg    INSTRUCTIONS:  All sites were deemed hemostatic. All sites were packed with guaze. The patient and escort were given verbal and written postoperative instructions. The patient was given extra guaze. The patient was instruction to follow up in one week.     ANESTHESIA RECORDS:   SEE ANESTHESIA RECORD FOR GENERAL ANESTHESIA CASES

## 2017-02-20 NOTE — ANESTHESIA PREPROCEDURE EVALUATION
Anesthetic History   No history of anesthetic complications            Review of Systems / Medical History  Patient summary reviewed and pertinent labs reviewed    Pulmonary  Within defined limits                 Neuro/Psych   Within defined limits           Cardiovascular    Hypertension          Past MI and CAD         GI/Hepatic/Renal                Endo/Other    Diabetes         Other Findings   Comments: Current Smoker? NO       Elective Surgery? Yes       Abstained from smoking 24 hours prior to anesthesia? N/A    Risk Factors for Postoperative nausea/vomiting:       History of postoperative nausea/vomiting? NO       Female? YES       Motion sickness? NO       Intended opioid administration for postoperative analgesia?   YES         Physical Exam    Airway  Mallampati: II  TM Distance: 4 - 6 cm  Neck ROM: normal range of motion   Mouth opening: Normal     Cardiovascular    Rhythm: regular  Rate: normal         Dental         Pulmonary  Breath sounds clear to auscultation               Abdominal  GI exam deferred       Other Findings            Anesthetic Plan    ASA: 3  Anesthesia type: general          Induction: Intravenous  Anesthetic plan and risks discussed with: Patient

## 2017-02-20 NOTE — PERIOP NOTES
Rec'd care of pt from OR via stretcher. Resp even and unlabored. Oral airway in place. Attached to monitor. OR, MAR and anesthesia report acknowledged. Blood pressure elevated but per Julissa Lopez CRNA - it is her basline. Will cont to monitor. Accu check 112. No coverage needed. 1150  CHINTAN Lira made aware of pt's blood pressure. No orders rec'd.

## 2017-02-20 NOTE — BRIEF OP NOTE
BRIEF OPERATIVE NOTE    Date of Procedure: 2/20/2017   Preoperative Diagnosis: k02.9,d64.9,e11.9,i10,b20,i63.50 Dental caries  Postoperative Diagnosis: k02.9,d64.9,e11.9,i10,b20,i63.50   Dental caries  Procedure(s):  Surgical extract retained roots #1,5,12,13,15,17,30  Surgical ext #3 [phone] 34  Surgeon(s) and Role:     * Jaclyn Bonilla DDS - Primary            Surgical Staff:  Circ-1: Marly Grimes  Scrub Tech-1: Mandie Ramírez  Event Time In   Incision Start 1018   Incision Close 1100     Anesthesia: General   Estimated Blood Loss: minimal  Specimens: * No specimens in log *   Findings: Grossly carious teeth  Complications: None  Implants: * No implants in log *

## 2017-02-20 NOTE — ANESTHESIA POSTPROCEDURE EVALUATION
Post-Anesthesia Evaluation & Assessment    Visit Vitals    BP (!) 190/100    Pulse 65    Temp 36.7 °C (98.1 °F)    Resp 17    Ht 5' 3\" (1.6 m)    Wt 55.3 kg (122 lb)    SpO2 100%    BMI 21.61 kg/m2       Nausea/Vomiting: no nausea and no vomiting    Post-operative hydration adequate. Pain score (VAS): 0    Mental status & Level of consciousness: alert and oriented x 3    Neurological status: moves all extremities, sensation grossly intact    Pulmonary status: airway patent, no supplemental oxygen required    Complications related to anesthesia: none    Patient has met all discharge requirements.     Additional comments:        Fabio Solorio, CRNA

## 2018-06-28 PROBLEM — K92.1 HEMATOCHEZIA: Status: ACTIVE | Noted: 2018-02-15

## 2018-06-28 PROBLEM — E78.00 HIGH BLOOD CHOLESTEROL: Status: ACTIVE | Noted: 2018-06-28

## 2018-06-28 PROBLEM — E11.9 DIABETES MELLITUS (HCC): Status: ACTIVE | Noted: 2018-06-28

## (undated) DEVICE — SUTURE VCRL SZ 3-0 L27IN ABSRB UD L26MM SH 1/2 CIR J416H

## (undated) DEVICE — MEDI-VAC SUCTION HANDLE REGULAR CAPACITY: Brand: CARDINAL HEALTH

## (undated) DEVICE — SOLUTION IV 1000ML 0.9% SOD CHL

## (undated) DEVICE — SYRINGE MED 20ML STD CLR PLAS LUERLOCK TIP N CTRL DISP

## (undated) DEVICE — NEEDLE HYPO 25GA L1.5IN BLU POLYPR HUB S STL REG BVL STR

## (undated) DEVICE — Z DISCONTINUED USE 2425483 (LOW STOCK PER MEDLINE) TAPE UMB L18IN DIA1/8IN WHT COT NONABSORBABLE W/O NDL FOR

## (undated) DEVICE — ARGYLE FRAZIER SURGICAL SUCTION INSTRUMENT 12 FR/CH (4.0 MM): Brand: ARGYLE

## (undated) DEVICE — INTENDED FOR TISSUE SEPARATION, AND OTHER PROCEDURES THAT REQUIRE A SHARP SURGICAL BLADE TO PUNCTURE OR CUT.: Brand: BARD-PARKER ® CARBON RIB-BACK BLADES

## (undated) DEVICE — CATHETER IV 10GA L3IN OD3.3-3.5MM ID2.64-2.74MM BRN FEP

## (undated) DEVICE — PACKING GZ W2INXL6FT WVN COT VAG RADPQ

## (undated) DEVICE — SPONGE GZ W4XL4IN COT 12 PLY TYP VII WVN C FLD DSGN

## (undated) DEVICE — STERILE LATEX POWDER-FREE SURGICAL GLOVESWITH NITRILE COATING: Brand: PROTEXIS

## (undated) DEVICE — SURGIFOAM SPNG SZ 12-7

## (undated) DEVICE — KENDALL SCD EXPRESS SLEEVES, KNEE LENGTH, MEDIUM: Brand: KENDALL SCD

## (undated) DEVICE — DEPAUL MINOR HEAD AND NECK: Brand: MEDLINE INDUSTRIES, INC.

## (undated) DEVICE — CATHETER IV 14GA L1.25IN ORNG POLY SFTY SYS FULL ENCASED

## (undated) DEVICE — SYR 10ML CTRL LR LCK NSAF LF --